# Patient Record
Sex: MALE | URBAN - METROPOLITAN AREA
[De-identification: names, ages, dates, MRNs, and addresses within clinical notes are randomized per-mention and may not be internally consistent; named-entity substitution may affect disease eponyms.]

---

## 2018-03-15 ENCOUNTER — OFFICE VISIT (OUTPATIENT)
Dept: PSYCHIATRY | Facility: CLINIC | Age: 33
End: 2018-03-15
Attending: PSYCHOLOGIST
Payer: COMMERCIAL

## 2018-03-15 DIAGNOSIS — F43.23 ADJUSTMENT DISORDER WITH MIXED ANXIETY AND DEPRESSED MOOD: Primary | ICD-10-CM

## 2018-03-15 ASSESSMENT — ANXIETY QUESTIONNAIRES
3. WORRYING TOO MUCH ABOUT DIFFERENT THINGS: NOT AT ALL
1. FEELING NERVOUS, ANXIOUS, OR ON EDGE: NOT AT ALL
2. NOT BEING ABLE TO STOP OR CONTROL WORRYING: NOT AT ALL
5. BEING SO RESTLESS THAT IT IS HARD TO SIT STILL: NOT AT ALL
GAD7 TOTAL SCORE: 0
7. FEELING AFRAID AS IF SOMETHING AWFUL MIGHT HAPPEN: NOT AT ALL
6. BECOMING EASILY ANNOYED OR IRRITABLE: NOT AT ALL
IF YOU CHECKED OFF ANY PROBLEMS ON THIS QUESTIONNAIRE, HOW DIFFICULT HAVE THESE PROBLEMS MADE IT FOR YOU TO DO YOUR WORK, TAKE CARE OF THINGS AT HOME, OR GET ALONG WITH OTHER PEOPLE: NOT DIFFICULT AT ALL

## 2018-03-15 ASSESSMENT — PATIENT HEALTH QUESTIONNAIRE - PHQ9: 5. POOR APPETITE OR OVEREATING: NOT AT ALL

## 2018-03-15 NOTE — MR AVS SNAPSHOT
After Visit Summary   3/15/2018    Sharad Hernandez    MRN: [id number]           Patient Information     Date Of Birth          1985        Visit Information        Provider Department      3/15/2018 4:00 PM Janett Prater, PhD [facility]        Today's Diagnoses     Adjustment disorder with mixed anxiety and depressed mood    -  1       Follow-ups after your visit        Your next 10 appointments already scheduled     Mar 28, 2018  4:00 PM CDT   Adult Psychotherapy with Janett Carey, PhD   Psychiatry Clinic ([facility])    [place] [id number]  [address] [id number] [phone]              Who to contact     Please call your clinic at [phone] to:    Ask questions about your health    Make or cancel appointments    Discuss your medicines    Learn about your test results    Speak to your doctor            Additional Information About Your Visit        MyChart Information     [employer] is an electronic gateway that provides easy, online access to your medical records. With [employer], you can request a clinic appointment, read your test results, renew a prescription or communicate with your care team.     To sign up for [employer] visit the website at [email or web address]   You will be asked to enter the access code listed below, as well as some personal information. Please follow the directions to create your username and password.     Your access code is: [email or web address]  Expires: 2018  5:12 PM     Your access code will  in 90 days. If you need help or a new code, please contact your [facility] or call [phone] for assistance.        Care EveryWhere ID     This is your Care EveryWhere ID. This could be used by other organizations to access your [place] medical records  [id number]         Blood Pressure from Last 3 Encounters:   No data found for BP    Weight from Last  3 Encounters:   No data found for Wt              Today, you had the following     No orders found for display       Primary Care Provider Office Phone # Fax #    Ej Solares [phone]       [address] [place] [address]        Equal Access to Services     ANNABELLA ZEPEDA : Hadii aad ku hadushahazel Sonoelali, waaxda luqadaha, qaybta kaalmada adelawsonyada, heri de aditiaislinn snelltawannasherry leon. So [facility] [phone].    ATENCIÓN: Si habla español, tiene a gore disposición servicios gratuitos de asistencia lingüística. Llame al [phone].    We comply with applicable federal civil rights laws and [place] laws. We do not discriminate on the basis of race, color, national origin, age, disability, sex, sexual orientation, or gender identity.            Thank you!     Thank you for choosing PSYCHIATRY CLINIC  for your care. Our goal is always to provide you with excellent care. Hearing back from our patients is one way we can continue to improve our services. Please take a few minutes to complete the written survey that you may receive in the mail after your visit with us. Thank you!             Your Updated Medication List - Protect others around you: Learn how to safely use, store and throw away your medicines at www.disposemymeds.org.      Notice  As of 3/15/2018  5:12 PM    You have not been prescribed any medications.
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
## 2018-03-29 ENCOUNTER — OFFICE VISIT (OUTPATIENT)
Dept: PSYCHIATRY | Facility: CLINIC | Age: 33
End: 2018-03-29
Attending: PSYCHOLOGIST
Payer: COMMERCIAL

## 2018-03-29 DIAGNOSIS — F43.23 ADJUSTMENT DISORDER WITH MIXED ANXIETY AND DEPRESSED MOOD: Primary | ICD-10-CM

## 2018-03-29 NOTE — PROGRESS NOTES
Therapy Notes  Provider: Janett Carey, PhD, L.P.  Start time: 1:02 pm Stop Time: 2:04 pm     Objective: D arrived on time.    Subjective: D and I discussed his lack of knowledge about his estranged wife and the uncertainly that this has created in his life. CURTIS is not ready to give up on the marriage. We discussed the various possibilities of his wife's well being and worked on CURTIS's catastrophic expectations with regards to his wife well being. We agreed that CURTIS would send an email to his wife during 3 consecutive weeks asking to clarify the state of the relationship. I assessed suicide ideation and underscored the availability of multiple options for his care.    Plan: Review homework      Diagnosis.  Adjustment disorder with mixed anxiety and depressed mood

## 2018-03-29 NOTE — MR AVS SNAPSHOT
After Visit Summary   3/29/2018    Sharad Hernandez    MRN: [id number]           Patient Information     Date Of Birth          1985        Visit Information        Provider Department      3/29/2018 1:00 PM Janett Prater, PhD Psychiatry Clinic        Today's Diagnoses     Adjustment disorder with mixed anxiety and depressed mood    -  1       Follow-ups after your visit        Your next 10 appointments already scheduled     2018  1:00 PM CDT   Adult Psychotherapy with Janett Carey, PhD   Psychiatry Clinic ([facility])    [place] [id number]  [address] [id number] [phone]              Who to contact     Please call your clinic at [phone] to:    Ask questions about your health    Make or cancel appointments    Discuss your medicines    Learn about your test results    Speak to your doctor            Additional Information About Your Visit        MyChart Information     [employer] is an electronic gateway that provides easy, online access to your medical records. With [employer], you can request a clinic appointment, read your test results, renew a prescription or communicate with your care team.     To sign up for [employer] visit the website at [email or web address]   You will be asked to enter the access code listed below, as well as some personal information. Please follow the directions to create your username and password.     Your access code is: [email or web address]  Expires: 2018  5:12 PM     Your access code will  in 90 days. If you need help or a new code, please contact your [facility] or call [phone] for assistance.        Care EveryWhere ID     This is your Care EveryWhere ID. This could be used by other organizations to access your [place] medical records  [id number]         Blood Pressure from Last 3 Encounters:   No data found for BP    Weight from Last  3 Encounters:   No data found for Wt              Today, you had the following     No orders found for display       Primary Care Provider Office Phone # Fax #    Ej Solares [phone]       [address] [place] [address]        Equal Access to Services     ANNABELLA ZEPEDA : Hadii aad ku hadushahazel Sonoelali, waaxda luqadaha, qaybta kaalmada adelawsonyada, heri de aditiaislinn snelltawannasherry leon. So [facility] [phone].    ATENCIÓN: Si habla español, tiene a gore disposición servicios gratuitos de asistencia lingüística. Llame al [phone].    We comply with applicable federal civil rights laws and [place] laws. We do not discriminate on the basis of race, color, national origin, age, disability, sex, sexual orientation, or gender identity.            Thank you!     Thank you for choosing PSYCHIATRY CLINIC  for your care. Our goal is always to provide you with excellent care. Hearing back from our patients is one way we can continue to improve our services. Please take a few minutes to complete the written survey that you may receive in the mail after your visit with us. Thank you!             Your Updated Medication List - Protect others around you: Learn how to safely use, store and throw away your medicines at www.disposemymeds.org.      Notice  As of 3/29/2018  2:02 PM    You have not been prescribed any medications.

## 2018-04-12 ENCOUNTER — OFFICE VISIT (OUTPATIENT)
Dept: PSYCHIATRY | Facility: CLINIC | Age: 33
End: 2018-04-12
Attending: PSYCHOLOGIST
Payer: COMMERCIAL

## 2018-04-12 DIAGNOSIS — F43.23 ADJUSTMENT DISORDER WITH MIXED ANXIETY AND DEPRESSED MOOD: Primary | ICD-10-CM

## 2018-04-12 NOTE — MR AVS SNAPSHOT
After Visit Summary   2018    Sharad Hernandez    MRN: [id number]           Patient Information     Date Of Birth          1985        Visit Information        Provider Department      2018 1:00 PM Janett Prater, PhD [facility]        Today's Diagnoses     Adjustment disorder with mixed anxiety and depressed mood    -  1       Follow-ups after your visit        Your next 10 appointments already scheduled     2018  1:00 PM CDT   Adult Psychotherapy with Janett Carey, PhD   Psychiatry Clinic ([facility])    [place] [id number]  [address] [id number] [phone]              Who to contact     Please call your clinic at [phone] to:    Ask questions about your health    Make or cancel appointments    Discuss your medicines    Learn about your test results    Speak to your doctor            Additional Information About Your Visit        MyChart Information     Proteros biostructurest is an electronic gateway that provides easy, online access to your medical records. With Promoter.io, you can request a clinic appointment, read your test results, renew a prescription or communicate with your care team.     To sign up for Proteros biostructurest visit the website at [email or web address]   You will be asked to enter the access code listed below, as well as some personal information. Please follow the directions to create your username and password.     Your access code is: [email or web address]  Expires: 2018  5:12 PM     Your access code will  in 90 days. If you need help or a new code, please contact your [facility] or call [phone] for assistance.        Care EveryWhere ID     This is your Care EveryWhere ID. This could be used by other organizations to access your [place] medical records  [id number]         Blood Pressure from Last 3 Encounters:   No data found for BP    Weight from Last  3 Encounters:   No data found for Wt              Today, you had the following     No orders found for display       Primary Care Provider Office Phone # Fax #    Ej Solares [phone]       [address] [place] [address]        Equal Access to Services     ANNABELLA ZEPEDA : Hadii aad ku hadushahazel Sonoelali, waaxda luqadaha, qaybta kaalmada adelawsonyada, heri de aditiaislinn snelltawannasherry leon. So [facility] [phone].    ATENCIÓN: Si habla español, tiene a gore disposición servicios gratuitos de asistencia lingüística. Llame al [phone].    We comply with applicable federal civil rights laws and [place] laws. We do not discriminate on the basis of race, color, national origin, age, disability, sex, sexual orientation, or gender identity.            Thank you!     Thank you for choosing [facility]  for your care. Our goal is always to provide you with excellent care. Hearing back from our patients is one way we can continue to improve our services. Please take a few minutes to complete the written survey that you may receive in the mail after your visit with us. Thank you!             Your Updated Medication List - Protect others around you: Learn how to safely use, store and throw away your medicines at www.disposemymeds.org.      Notice  As of 4/12/2018  2:01 PM    You have not been prescribed any medications.

## 2018-04-12 NOTE — PROGRESS NOTES
Therapy Notes  Provider: Janett Carey, PhD, L.P.  Start time: 1:02 pm Stop Time: 2:00 pm      Objective: D arrived on time.     Subjective: D and I reviewed his treatment plan in detailed. We also reviewed his sense of guilt and responsibility for having been absent during his father's illness due to feeling duty bound to his wife who was forbidding him or dissuading him consistently of maintaining or keeping relations with family and friends. D described several instances of his wife demanding that he stood up for her due to perceived social attacks from D and his friends. This resulted in CURTIS keeping his social ties hidden from his wife and having to contact family and friends during his work hours or while commuting in his car.    Plan: Review sense of responsibility and guilt linked to wife's mental illness.     Diagnosis.  Adjustment disorder with mixed anxiety and depressed mood

## 2018-04-20 ENCOUNTER — OFFICE VISIT (OUTPATIENT)
Dept: PSYCHIATRY | Facility: CLINIC | Age: 33
End: 2018-04-20
Attending: PSYCHOLOGIST
Payer: COMMERCIAL

## 2018-04-20 DIAGNOSIS — F43.23 ADJUSTMENT DISORDER WITH MIXED ANXIETY AND DEPRESSED MOOD: Primary | ICD-10-CM

## 2018-04-20 NOTE — MR AVS SNAPSHOT
After Visit Summary   2018    Sharad Hernandez    MRN: [id number]           Patient Information     Date Of Birth          1985        Visit Information        Provider Department      2018 1:00 PM Janett Prater, PhD [facility]        Today's Diagnoses     Adjustment disorder with mixed anxiety and depressed mood    -  1       Follow-ups after your visit        Your next 10 appointments already scheduled     2018  1:00 PM CDT   Adult Psychotherapy with Janett Carey, PhD   Psychiatry Clinic ([facility])    [place] [id number]  [address] [id number] [phone]              Who to contact     Please call your clinic at [phone] to:    Ask questions about your health    Make or cancel appointments    Discuss your medicines    Learn about your test results    Speak to your doctor            Additional Information About Your Visit        MyChart Information     LeadPointt is an electronic gateway that provides easy, online access to your medical records. With VanDyne SuperTurbo, you can request a clinic appointment, read your test results, renew a prescription or communicate with your care team.     To sign up for LeadPointt visit the website at [email or web address]   You will be asked to enter the access code listed below, as well as some personal information. Please follow the directions to create your username and password.     Your access code is: [email or web address]  Expires: 2018  5:12 PM     Your access code will  in 90 days. If you need help or a new code, please contact your [facility] or call [phone] for assistance.        Care EveryWhere ID     This is your Care EveryWhere ID. This could be used by other organizations to access your [place] medical records  [id number]         Blood Pressure from Last 3 Encounters:   No data found for BP    Weight from Last  3 Encounters:   No data found for Wt              Today, you had the following     No orders found for display       Primary Care Provider Office Phone # Fax #    Ej Solares [phone]       [address] [place] [address]        Equal Access to Services     ANNABELLA ZEPEDA : Hadii aad ku hadushahazel Sonoelali, waaxda luqadaha, qaybta kaalmada adelawsonyada, heri de aditiaislinn snelltawannasherry leon. So [facility] [phone].    ATENCIÓN: Si habla español, tiene a gore disposición servicios gratuitos de asistencia lingüística. Llame al [phone].    We comply with applicable federal civil rights laws and [place] laws. We do not discriminate on the basis of race, color, national origin, age, disability, sex, sexual orientation, or gender identity.            Thank you!     Thank you for choosing [facility]RY CLINIC  for your care. Our goal is always to provide you with excellent care. Hearing back from our patients is one way we can continue to improve our services. Please take a few minutes to complete the written survey that you may receive in the mail after your visit with us. Thank you!             Your Updated Medication List - Protect others around you: Learn how to safely use, store and throw away your medicines at www.disposemymeds.org.      Notice  As of 4/20/2018  2:15 PM    You have not been prescribed any medications.

## 2018-04-20 NOTE — PROGRESS NOTES
Therapy Notes  Provider: Janett Carey, PhD, L.P.  Start time: 1:10 pm Stop Time: 2:10 pm       Objective: D arrived on time.      Subjective: D and and I reviewed his goals for therapy and his recent interactions with his wife's family. We also discussed his fears of not been accepted by a future partner given that he holds strong Zoroastrianism beliefs about ending a marriage and that he would want to see partners in the future that hold similar believes. We discussed D's desire to become more insightful about how and why is he attracted to a given partner and D agreed with me to begin using an attachment therapy franework to examine both his framework for relationships and the needs that he hopes to fulfill in future relationships.    Plan: Review attachment status with regards to relationships particularly parents and past significant others.    Diagnosis.  Adjustment disorder with mixed anxiety and depressed mood

## 2018-04-27 ENCOUNTER — OFFICE VISIT (OUTPATIENT)
Dept: PSYCHIATRY | Facility: CLINIC | Age: 33
End: 2018-04-27
Attending: PSYCHOLOGIST
Payer: COMMERCIAL

## 2018-04-27 DIAGNOSIS — F43.23 ADJUSTMENT DISORDER WITH MIXED ANXIETY AND DEPRESSED MOOD: Primary | ICD-10-CM

## 2018-04-27 NOTE — PROGRESS NOTES
Therapy Notes  Provider: Jaentt Carey, PhD, L.P.  Start time: 1:10 pm Stop Time: 2:10 pm       Objective: D arrived on time.      Subjective: D stated that he was served with a divorce, his grandmother is dying and is construction season. We spent the entire session actively engage in emotion regulation and copying strategy shaping.     Plan: Check on how the copying strategy shaping we devised is working.  Diagnosis.  Adjustment disorder with mixed anxiety and depressed mood

## 2018-04-27 NOTE — MR AVS SNAPSHOT
After Visit Summary   2018    hSarad Hernandez    MRN: [id number]           Patient Information     Date Of Birth          1985        Visit Information        Provider Department      2018 1:00 PM Janett Prater, PhD [facility]        Today's Diagnoses     Adjustment disorder with mixed anxiety and depressed mood    -  1       Follow-ups after your visit        Who to contact     Please call your clinic at [phone] to:    Ask questions about your health    Make or cancel appointments    Discuss your medicines    Learn about your test results    Speak to your doctor            Additional Information About Your Visit        MyChart Information     TextMaster is an electronic gateway that provides easy, online access to your medical records. With TextMaster, you can request a clinic appointment, read your test results, renew a prescription or communicate with your care team.     To sign up for IndiaHomest visit the website at [email or web address]   You will be asked to enter the access code listed below, as well as some personal information. Please follow the directions to create your username and password.     Your access code is: [email or web address]  Expires: 2018  5:12 PM     Your access code will  in 90 days. If you need help or a new code, please contact your [facility] or call [phone] for assistance.        Care EveryWhere ID     This is your Care EveryWhere ID. This could be used by other organizations to access your [place] medical records  [id number]         Blood Pressure from Last 3 Encounters:   No data found for BP    Weight from Last 3 Encounters:   No data found for Wt              Today, you had the following     No orders found for display       Primary Care Provider Office Phone # Fax #    Ej Solares [phone] [address] [facility] [address]        Equal Access to Services      ANNABELLA ZEPEDA : Hadii aad shelby farhad Zhao, waedida luqadaha, qaybta kaalmada lorikailyncierra, heri snelltawannasherry leon. So [facility] [phone].    ATENCIÓN: Si habla español, tiene a gore disposición servicios gratuitos de asistencia lingüística. Llame al [phone].    We comply with applicable federal civil rights laws and [place] laws. We do not discriminate on the basis of race, color, national origin, age, disability, sex, sexual orientation, or gender identity.            Thank you!     Thank you for choosing [facility]RY CLINIC  for your care. Our goal is always to provide you with excellent care. Hearing back from our patients is one way we can continue to improve our services. Please take a few minutes to complete the written survey that you may receive in the mail after your visit with us. Thank you!             Your Updated Medication List - Protect others around you: Learn how to safely use, store and throw away your medicines at www.disposemymeds.org.      Notice  As of 4/27/2018  2:07 PM    You have not been prescribed any medications.

## 2018-05-04 ENCOUNTER — OFFICE VISIT (OUTPATIENT)
Dept: PSYCHIATRY | Facility: CLINIC | Age: 33
End: 2018-05-04
Attending: PSYCHIATRY & NEUROLOGY
Payer: COMMERCIAL

## 2018-05-04 DIAGNOSIS — F43.23 ADJUSTMENT DISORDER WITH MIXED ANXIETY AND DEPRESSED MOOD: Primary | ICD-10-CM

## 2018-05-04 NOTE — MR AVS SNAPSHOT
After Visit Summary   2018    Sharad Hernandez    MRN: [id number]           Patient Information     Date Of Birth          1985        Visit Information        Provider Department      2018 2:00 PM Janett Prater, PhD [facility]ry [facility]        Today's Diagnoses     Adjustment disorder with mixed anxiety and depressed mood    -  1       Follow-ups after your visit        Your next 10 appointments already scheduled     May 11, 2018  5:00 PM CDT   Adult Psychotherapy with Janett Carey, PhD   Psychiatry Clinic ([facility])    [address]  [address] [phone]              Who to contact     Please call your clinic at [phone] to:    Ask questions about your health    Make or cancel appointments    Discuss your medicines    Learn about your test results    Speak to your doctor            Additional Information About Your Visit        MyChart Information     Digital Message Displayt is an electronic gateway that provides easy, online access to your medical records. With MSM Protein Technologies, you can request a clinic appointment, read your test results, renew a prescription or communicate with your care team.     To sign up for Digital Message Displayt visit the website at [email or web address]   You will be asked to enter the access code listed below, as well as some personal information. Please follow the directions to create your username and password.     Your access code is: [email or web address]  Expires: 2018  5:12 PM     Your access code will  in 90 days. If you need help or a new code, please contact your [facility] or call [phone] for assistance.        Care EveryWhere ID     This is your Care EveryWhere ID. This could be used by other organizations to access your [place] medical records  [id number]         Blood Pressure from Last 3 Encounters:   No data found for BP    Weight from Last 3  Encounters:   No data found for Wt              Today, you had the following     No orders found for display       Primary Care Provider Office Phone # Fax #    Ej Solares [phone]       [address] [place] [address]        Equal Access to Services     ANNABELLA ZEPEDA : Hadii aad ku hadushahazel Sonoelali, waaxda luqadaha, qaybta kaalmada adelawsonkailynda, waxay idiin hayomaraislinn snelltawannasherry leon. So [facility] [phone].    ATENCIÓN: Si habla español, tiene a gore disposición servicios gratuitos de asistencia lingüística. Llame al [phone].    We comply with applicable federal civil rights laws and [place] laws. We do not discriminate on the basis of race, color, national origin, age, disability, sex, sexual orientation, or gender identity.            Thank you!     Thank you for choosing [facility]ATRY CLINIC  for your care. Our goal is always to provide you with excellent care. Hearing back from our patients is one way we can continue to improve our services. Please take a few minutes to complete the written survey that you may receive in the mail after your visit with us. Thank you!             Your Updated Medication List - Protect others around you: Learn how to safely use, store and throw away your medicines at www.disposemymeds.org.      Notice  As of 5/4/2018  3:08 PM    You have not been prescribed any medications.

## 2018-05-04 NOTE — PROGRESS NOTES
Therapy Notes  Provider: Janett Carey, PhD, L.P.  Start time: 2:09 pm Stop Time: 3:08  pm       Objective: D arrived on time.      Subjective: D stated that his wife served him with divorce papers. We discussed his emotional coping strategies with these upsetting news and spent the rest of the session addressing attachment patterns in romantic relationships and how they could have influenced both his current situation, his past romantic history and his future choices.      Assessment: CURTIS is coping well with the stressors of the divorce.    Plan: Continue reviewing coping with the divorce and continue exploring romantic attachment history and needs enacted in CURTIS's past that might inform his future choices.    Diagnosis.  Adjustment disorder with mixed anxiety and depressed mood

## 2018-05-11 ENCOUNTER — OFFICE VISIT (OUTPATIENT)
Dept: PSYCHIATRY | Facility: CLINIC | Age: 33
End: 2018-05-11
Attending: PSYCHOLOGIST
Payer: COMMERCIAL

## 2018-05-11 DIAGNOSIS — F43.23 ADJUSTMENT DISORDER WITH MIXED ANXIETY AND DEPRESSED MOOD: Primary | ICD-10-CM

## 2018-05-11 NOTE — PROGRESS NOTES
Therapy Notes  Provider: Janett Carey, PhD, L.P.  Start time: 5:00 pm Stop Time: 6:05 pm       Objective: D arrived on time.      Subjective: D stated that his grandmother is doing relatively well. He has not received any news from his ex wife since the divorce was filed. We talked about the things that he would miss about his marriage including having someone to come home and somebody to cook for. He also stated that he would miss his sex life and his connection with his wife.      Assessment: CURTIS is coping well with the stressors of the divorce.     Plan: Continue reviewing coping with the divorce and continue exploring romantic attachment history and needs enacted in CURTIS's past that might inform his future choices.     Diagnosis.  Adjustment disorder with mixed anxiety and depressed mood

## 2018-05-11 NOTE — MR AVS SNAPSHOT
After Visit Summary   2018    Sharad Hernandez    MRN: [id number]           Patient Information     Date Of Birth          1985        Visit Information        Provider Department      2018 5:00 PM Janett Prater, PhD Psych[facility]ry Clinic        Today's Diagnoses     Adjustment disorder with mixed anxiety and depressed mood    -  1       Follow-ups after your visit        Who to contact     Please call your clinic at [phone] to:    Ask questions about your health    Make or cancel appointments    Discuss your medicines    Learn about your test results    Speak to your doctor            Additional Information About Your Visit        MyChart Information     Remind Technologies is an electronic gateway that provides easy, online access to your medical records. With Remind Technologies, you can request a clinic appointment, read your test results, renew a prescription or communicate with your care team.     To sign up for Metaweb Technologiest visit the website at [email or web address]   You will be asked to enter the access code listed below, as well as some personal information. Please follow the directions to create your username and password.     Your access code is: [email or web address]  Expires: 2018  5:12 PM     Your access code will  in 90 days. If you need help or a new code, please contact your [facility] or call [phone] for assistance.        Care EveryWhere ID     This is your Care EveryWhere ID. This could be used by other organizations to access your [place] medical records  [id number]         Blood Pressure from Last 3 Encounters:   No data found for BP    Weight from Last 3 Encounters:   No data found for Wt              Today, you had the following     No orders found for display       Primary Care Provider Office Phone # Fax #    Ej Solares [phone] [address] [facility] [address]        Equal Access to Services      ANNABELLA ZEPEDA : Hadii aad shelby farhad Zhao, waedida luqadaha, qaybta kaalmada lorikailyncierra, heri snelltawannasherry leon. So [facility] [phone].    ATENCIÓN: Si habla español, tiene a gore disposición servicios gratuitos de asistencia lingüística. Llame al [phone].    We comply with applicable federal civil rights laws and [place] laws. We do not discriminate on the basis of race, color, national origin, age, disability, sex, sexual orientation, or gender identity.            Thank you!     Thank you for choosing [facility]RY [facility]IN[facility]  for your care. Our goal is always to provide you with excellent care. Hearing back from our patients is one way we can continue to improve our services. Please take a few minutes to complete the written survey that you may receive in the mail after your visit with us. Thank you!             Your Updated Medication List - Protect others around you: Learn how to safely use, store and throw away your medicines at www.disposemymeds.org.      Notice  As of 5/11/2018  6:03 PM    You have not been prescribed any medications.

## 2018-05-16 ASSESSMENT — ANXIETY QUESTIONNAIRES: GAD7 TOTAL SCORE: 0

## 2018-05-16 ASSESSMENT — PATIENT HEALTH QUESTIONNAIRE - PHQ9: SUM OF ALL RESPONSES TO PHQ QUESTIONS 1-9: 2

## 2018-05-24 ENCOUNTER — OFFICE VISIT (OUTPATIENT)
Dept: PSYCHIATRY | Facility: CLINIC | Age: 33
End: 2018-05-24
Attending: PSYCHOLOGIST
Payer: COMMERCIAL

## 2018-05-24 DIAGNOSIS — F43.23 ADJUSTMENT DISORDER WITH MIXED ANXIETY AND DEPRESSED MOOD: Primary | ICD-10-CM

## 2018-05-24 NOTE — PROGRESS NOTES
Therapy Notes  Provider: Janett Carey, PhD, L.P.  Start time: 5:00 pm Stop Time:5:56 pm       Objective: D arrived on time.      Subjective: D stated that his grandmother appears to be doing a bit better and they have moved her to assisted living. We talked about his fears of been evaluated negatively at his work because he is at 80% as well as how the different stressors that he is experiencing changes the perception of the stressors at work by augmenting them.      Assessment: CURTIS is coping well with the stressors of the divorce.      Plan: Continue reviewing coping with the divorce and continue exploring romantic attachment history and needs enacted in CURTIS's past that might inform his future choices.      Diagnosis.  Adjustment disorder with mixed anxiety and depressed mood

## 2018-05-24 NOTE — MR AVS SNAPSHOT
After Visit Summary   2018    Sharad Hernandez    MRN: [id number]           Patient Information     Date Of Birth          1985        Visit Information        Provider Department      2018 5:00 PM Janett Prater, PhD [facility]        Today's Diagnoses     Adjustment disorder with mixed anxiety and depressed mood    -  1       Follow-ups after your visit        Who to contact     Please call your clinic at [phone] to:    Ask questions about your health    Make or cancel appointments    Discuss your medicines    Learn about your test results    Speak to your doctor            Additional Information About Your Visit        MyChart Information     TheCommentor is an electronic gateway that provides easy, online access to your medical records. With TheCommentor, you can request a clinic appointment, read your test results, renew a prescription or communicate with your care team.     To sign up for Barburritot visit the website at [email or web address]   You will be asked to enter the access code listed below, as well as some personal information. Please follow the directions to create your username and password.     Your access code is: [email or web address]  Expires: 2018  4:51 PM     Your access code will  in 90 days. If you need help or a new code, please contact your [facility] or call [phone] for assistance.        Care EveryWhere ID     This is your Care EveryWhere ID. This could be used by other organizations to access your [place] medical records  [id number]         Blood Pressure from Last 3 Encounters:   No data found for BP    Weight from Last 3 Encounters:   No data found for Wt              Today, you had the following     No orders found for display       Primary Care Provider Office Phone # Fax #    Ej Solares [phone] [address] [facility] [address]        Equal Access to Services      ANNABELLA ZEPEDA : Hadii aad shelby farhad Zhao, waedida luqadaha, qaybta kaalmada lorikailyncierra, heri snelltawannasherry leon. So [facility] [phone].    ATENCIÓN: Si habla español, tiene a gore disposición servicios gratuitos de asistencia lingüística. Llame al [phone].    We comply with applicable federal civil rights laws and [place] laws. We do not discriminate on the basis of race, color, national origin, age, disability, sex, sexual orientation, or gender identity.            Thank you!     Thank you for choosing [facility]  for your care. Our goal is always to provide you with excellent care. Hearing back from our patients is one way we can continue to improve our services. Please take a few minutes to complete the written survey that you may receive in the mail after your visit with us. Thank you!             Your Updated Medication List - Protect others around you: Learn how to safely use, store and throw away your medicines at www.disposemymeds.org.      Notice  As of 5/24/2018  5:57 PM    You have not been prescribed any medications.

## 2018-05-31 ENCOUNTER — OFFICE VISIT (OUTPATIENT)
Dept: PSYCHIATRY | Facility: CLINIC | Age: 33
End: 2018-05-31
Attending: PSYCHOLOGIST
Payer: COMMERCIAL

## 2018-05-31 DIAGNOSIS — F43.23 ADJUSTMENT DISORDER WITH MIXED ANXIETY AND DEPRESSED MOOD: Primary | ICD-10-CM
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
## 2018-05-31 NOTE — MR AVS SNAPSHOT
After Visit Summary   2018    Sharad Hernandez    MRN: [id number]           Patient Information     Date Of Birth          1985        Visit Information        Provider Department      2018 4:00 PM Janett Prater, PhD [facility]        Today's Diagnoses     Adjustment disorder with mixed anxiety and depressed mood    -  1       Follow-ups after your visit        Who to contact     Please call your clinic at [phone] to:    Ask questions about your health    Make or cancel appointments    Discuss your medicines    Learn about your test results    Speak to your doctor            Additional Information About Your Visit        MyChart Information     Konoz is an electronic gateway that provides easy, online access to your medical records. With Konoz, you can request a clinic appointment, read your test results, renew a prescription or communicate with your care team.     To sign up for wiMANt visit the website at [email or web address]   You will be asked to enter the access code listed below, as well as some personal information. Please follow the directions to create your username and password.     Your access code is: [email or web address]  Expires: 2018  4:51 PM     Your access code will  in 90 days. If you need help or a new code, please contact your [facility] or call [phone] for assistance.        Care EveryWhere ID     This is your Care EveryWhere ID. This could be used by other organizations to access your [place] medical records  [id number]         Blood Pressure from Last 3 Encounters:   No data found for BP    Weight from Last 3 Encounters:   No data found for Wt              Today, you had the following     No orders found for display       Primary Care Provider Office Phone # Fax #    Ej Solares [phone] [address] [facility] [address]        Equal Access to Services      ANNABELLA ZEPEDA : Hadii aad shelby farhad Zhao, waedida luqadaha, qaybta kaalmada lorikailyncierra, heri snelltawannasherry leon. So [facility] [phone].    ATENCIÓN: Si habla español, tiene a gore disposición servicios gratuitos de asistencia lingüística. Llame al [phone].    We comply with applicable federal civil rights laws and [place] laws. We do not discriminate on the basis of race, color, national origin, age, disability, sex, sexual orientation, or gender identity.            Thank you!     Thank you for choosing [facility]IN[facility]  for your care. Our goal is always to provide you with excellent care. Hearing back from our patients is one way we can continue to improve our services. Please take a few minutes to complete the written survey that you may receive in the mail after your visit with us. Thank you!             Your Updated Medication List - Protect others around you: Learn how to safely use, store and throw away your medicines at www.disposemymeds.org.      Notice  As of 5/31/2018  5:01 PM    You have not been prescribed any medications.

## 2018-06-07 ENCOUNTER — OFFICE VISIT (OUTPATIENT)
Dept: PSYCHIATRY | Facility: CLINIC | Age: 33
End: 2018-06-07
Attending: PSYCHOLOGIST
Payer: COMMERCIAL

## 2018-06-07 DIAGNOSIS — F43.23 ADJUSTMENT DISORDER WITH MIXED ANXIETY AND DEPRESSED MOOD: Primary | ICD-10-CM

## 2018-06-07 NOTE — MR AVS SNAPSHOT
After Visit Summary   2018    Sharad Hernandez    MRN: [id number]           Patient Information     Date Of Birth          1985        Visit Information        Provider Department      2018 5:00 PM Janett Prater, PhD [facility]        Today's Diagnoses     Adjustment disorder with mixed anxiety and depressed mood    -  1       Follow-ups after your visit        Who to contact     Please call your clinic at [phone] to:    Ask questions about your health    Make or cancel appointments    Discuss your medicines    Learn about your test results    Speak to your doctor            Additional Information About Your Visit        MyChart Information     poLight is an electronic gateway that provides easy, online access to your medical records. With poLight, you can request a clinic appointment, read your test results, renew a prescription or communicate with your care team.     To sign up for Coherent Labst visit the website at [email or web address]   You will be asked to enter the access code listed below, as well as some personal information. Please follow the directions to create your username and password.     Your access code is: [email or web address]  Expires: 2018  4:51 PM     Your access code will  in 90 days. If you need help or a new code, please contact your [facility] or call [phone] for assistance.        Care EveryWhere ID     This is your Care EveryWhere ID. This could be used by other organizations to access your [place] medical records  [id number]         Blood Pressure from Last 3 Encounters:   No data found for BP    Weight from Last 3 Encounters:   No data found for Wt              Today, you had the following     No orders found for display       Primary Care Provider Office Phone # Fax #    Ej Solares [phone] [address] [facility] [address]        Equal Access to Services     ANNABELLA  GAAR : Hadii aad ku hadushahazel Pavel, waedida luqadaha, qaybta kajajacierra sandovalkailyncierra, heri snelltawannasherry leon. So [facility] [phone].    ATENCIÓN: Si habla español, tiene a gore disposición servicios gratuitos de asistencia lingüística. Llame al [phone].    We comply with applicable federal civil rights laws and [place] laws. We do not discriminate on the basis of race, color, national origin, age, disability, sex, sexual orientation, or gender identity.            Thank you!     Thank you for choosing [facility]  for your care. Our goal is always to provide you with excellent care. Hearing back from our patients is one way we can continue to improve our services. Please take a few minutes to complete the written survey that you may receive in the mail after your visit with us. Thank you!             Your Updated Medication List - Protect others around you: Learn how to safely use, store and throw away your medicines at www.disposemymeds.org.      Notice  As of 6/7/2018  5:47 PM    You have not been prescribed any medications.

## 2018-06-07 NOTE — PROGRESS NOTES
Therapy Notes  Provider: Janett Carey, PhD, L.P.  Start time: 5:00 pm Stop Time: 5:50 pm       Objective: CURTIS arrived on time.      Subjective: CURTIS stated that her GM is an assisted living situation. He stated that his grandmother is in a good place though he feels like his parents are under a lot of pressure so he feels that he need to spend time with them. His father has multiple myeloma, he has also a demanding job as a outpatient nurse. We talked about his father's anxiety regarding loosing his job and the connection between that anxiety and his own anxiety, We decided that the best thing would be for CURTIS to have an empathic conversation with this father and bond with him around anxieties that are mutual and to openly discuss possibilities for diminishing the intensity of the work, while been respectful of his father dignity and self-determination.      Assessment: CURTIS is coping well with the stressors of the divorce, by working out, spending time with his nephews and hanging out with his family.    Plan: Continue exposure as needed.      Diagnosis.  Adjustment disorder with mixed anxiety and depressed mood

## 2018-06-14 ENCOUNTER — OFFICE VISIT (OUTPATIENT)
Dept: PSYCHIATRY | Facility: CLINIC | Age: 33
End: 2018-06-14
Attending: PSYCHOLOGIST
Payer: COMMERCIAL

## 2018-06-14 DIAGNOSIS — F43.23 ADJUSTMENT DISORDER WITH MIXED ANXIETY AND DEPRESSED MOOD: Primary | ICD-10-CM

## 2018-06-14 NOTE — MR AVS SNAPSHOT
After Visit Summary   2018    Sharad Hernandez    MRN: [id number]           Patient Information     Date Of Birth          1985        Visit Information        Provider Department      2018 5:00 PM Janett Prater, PhD [facility]        Today's Diagnoses     Adjustment disorder with mixed anxiety and depressed mood    -  1       Follow-ups after your visit        Who to contact     Please call your clinic at [phone] to:    Ask questions about your health    Make or cancel appointments    Discuss your medicines    Learn about your test results    Speak to your doctor            Additional Information About Your Visit        MyChart Information     Synergos is an electronic gateway that provides easy, online access to your medical records. With Synergos, you can request a clinic appointment, read your test results, renew a prescription or communicate with your care team.     To sign up for Forge Life Sciencet visit the website at [email or web address]   You will be asked to enter the access code listed below, as well as some personal information. Please follow the directions to create your username and password.     Your access code is: [email or web address]  Expires: 2018  4:51 PM     Your access code will  in 90 days. If you need help or a new code, please contact your [facility] or call [phone] for assistance.        Care EveryWhere ID     This is your Care EveryWhere ID. This could be used by other organizations to access your [place] medical records  [id number]         Blood Pressure from Last 3 Encounters:   No data found for BP    Weight from Last 3 Encounters:   No data found for Wt              Today, you had the following     No orders found for display       Primary Care Provider Office Phone # Fax #    Ej Solares [phone] [address] [facility] [address]        Equal Access to Services      ANNABELLA ZEPEDA : Hadii aad shelby farhad Zhao, waedida luqadaha, qaybta kaalmada lorikailyncierra, heri snelltawannasherry leon. So [facility] [phone].    ATENCIÓN: Si habla español, tiene a gore disposición servicios gratuitos de asistencia lingüística. Llame al [phone].    We comply with applicable federal civil rights laws and [place] laws. We do not discriminate on the basis of race, color, national origin, age, disability, sex, sexual orientation, or gender identity.            Thank you!     Thank you for choosing [facility]  for your care. Our goal is always to provide you with excellent care. Hearing back from our patients is one way we can continue to improve our services. Please take a few minutes to complete the written survey that you may receive in the mail after your visit with us. Thank you!             Your Updated Medication List - Protect others around you: Learn how to safely use, store and throw away your medicines at www.disposemymeds.org.      Notice  As of 6/14/2018  6:03 PM    You have not been prescribed any medications.
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
## 2018-06-28 ENCOUNTER — OFFICE VISIT (OUTPATIENT)
Dept: PSYCHIATRY | Facility: CLINIC | Age: 33
End: 2018-06-28
Attending: PSYCHOLOGIST
Payer: COMMERCIAL

## 2018-06-28 DIAGNOSIS — F43.23 ADJUSTMENT DISORDER WITH MIXED ANXIETY AND DEPRESSED MOOD: Primary | ICD-10-CM
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
## 2018-06-28 NOTE — MR AVS SNAPSHOT
After Visit Summary   2018    Sharad Hernandez    MRN: [id number]           Patient Information     Date Of Birth          1985        Visit Information        Provider Department      2018 4:00 PM Janett Prater, PhD [facility]        Today's Diagnoses     Adjustment disorder with mixed anxiety and depressed mood    -  1       Follow-ups after your visit        Who to contact     Please call your clinic at [phone] to:    Ask questions about your health    Make or cancel appointments    Discuss your medicines    Learn about your test results    Speak to your doctor            Additional Information About Your Visit        MyChart Information     SeniorLiving.Net is an electronic gateway that provides easy, online access to your medical records. With SeniorLiving.Net, you can request a clinic appointment, read your test results, renew a prescription or communicate with your care team.     To sign up for Echovoxt visit the website at [email or web address]   You will be asked to enter the access code listed below, as well as some personal information. Please follow the directions to create your username and password.     Your access code is: [email or web address]  Expires: 2018  4:51 PM     Your access code will  in 90 days. If you need help or a new code, please contact your [facility] or call [phone] for assistance.        Care EveryWhere ID     This is your Care EveryWhere ID. This could be used by other organizations to access your [place] medical records  [id number]         Blood Pressure from Last 3 Encounters:   No data found for BP    Weight from Last 3 Encounters:   No data found for Wt              Today, you had the following     No orders found for display       Primary Care Provider Office Phone # Fax #    Ej Solares [phone] [address] [facility] [address]        Equal Access to Services      ANNABELLA ZEPEDA : Hadii aad shelby farhad Zhao, waedida luqadaha, qaybta kaalmada lorikailyncierra, heri snelltawannasherry leon. So [facility] [phone].    ATENCIÓN: Si habla español, tiene a gore disposición servicios gratuitos de asistencia lingüística. Llame al [phone].    We comply with applicable federal civil rights laws and [place] laws. We do not discriminate on the basis of race, color, national origin, age, disability, sex, sexual orientation, or gender identity.            Thank you!     Thank you for choosing [facility]  for your care. Our goal is always to provide you with excellent care. Hearing back from our patients is one way we can continue to improve our services. Please take a few minutes to complete the written survey that you may receive in the mail after your visit with us. Thank you!             Your Updated Medication List - Protect others around you: Learn how to safely use, store and throw away your medicines at www.disposemymeds.org.      Notice  As of 6/28/2018  4:57 PM    You have not been prescribed any medications.

## 2018-07-18 ENCOUNTER — OFFICE VISIT (OUTPATIENT)
Dept: PSYCHIATRY | Facility: CLINIC | Age: 33
End: 2018-07-18
Attending: PSYCHOLOGIST
Payer: COMMERCIAL

## 2018-07-18 DIAGNOSIS — F43.23 ADJUSTMENT DISORDER WITH MIXED ANXIETY AND DEPRESSED MOOD: Primary | ICD-10-CM

## 2018-07-18 NOTE — MR AVS SNAPSHOT
After Visit Summary   2018    Sharad Hernandez    MRN: [id number]           Patient Information     Date Of Birth          1985        Visit Information        Provider Department      2018 4:00 PM Janett Prater, PhD [facility]        Today's Diagnoses     Adjustment disorder with mixed anxiety and depressed mood    -  1       Follow-ups after your visit        Your next 10 appointments already scheduled     Aug 01, 2018  4:00 PM CDT   Adult Psychotherapy with Janett Carey, PhD   Psychiatry Clinic ([facility])    [place] [id number]  [address] [id number] [phone]              Who to contact     Please call your clinic at [phone] to:    Ask questions about your health    Make or cancel appointments    Discuss your medicines    Learn about your test results    Speak to your doctor            Additional Information About Your Visit        MyChart Information     Faveoust is an electronic gateway that provides easy, online access to your medical records. With mmCHANNEL, you can request a clinic appointment, read your test results, renew a prescription or communicate with your care team.     To sign up for Faveoust visit the website at [email or web address]   You will be asked to enter the access code listed below, as well as some personal information. Please follow the directions to create your username and password.     Your access code is: [email or web address]  Expires: 2018  4:51 PM     Your access code will  in 90 days. If you need help or a new code, please contact your [facility] or call [phone] for assistance.        Care EveryWhere ID     This is your Care EveryWhere ID. This could be used by other organizations to access your [place] medical records  [id number]         Blood Pressure from Last 3 Encounters:   No data found for BP    Weight from Last  3 Encounters:   No data found for Wt              Today, you had the following     No orders found for display       Primary Care Provider Office Phone # Fax #    Ej Solares [phone]       [address] [place] [address]        Equal Access to Services     ANNABELLA ZEPEDA : Hadii aad ku hadushahazel Sonoelali, waaxda luqadaha, qaybta kaalmada adelawsonyada, waxay idiin hayomaraislinn snelltawannasherry leon. So [facility] [phone].    ATENCIÓN: Si habla español, tiene a gore disposición servicios gratuitos de asistencia lingüística. Llame al [phone].    We comply with applicable federal civil rights laws and [place] laws. We do not discriminate on the basis of race, color, national origin, age, disability, sex, sexual orientation, or gender identity.            Thank you!     Thank you for choosing [facility]IN[facility]  for your care. Our goal is always to provide you with excellent care. Hearing back from our patients is one way we can continue to improve our services. Please take a few minutes to complete the written survey that you may receive in the mail after your visit with us. Thank you!             Your Updated Medication List - Protect others around you: Learn how to safely use, store and throw away your medicines at www.disposemymeds.org.      Notice  As of 7/18/2018  5:06 PM    You have not been prescribed any medications.

## 2018-07-18 NOTE — PROGRESS NOTES
Therapy Notes  Provider: Janett Carey, PhD, L.P.  Start time: 4:08 pm Stop Time: 5:05 pm       Objective: D arrived on time.      Subjective: D reported that they are considering mediation and that this has caused significant anxiety and he has not been able to talk to his  yet. D reported that he went to a wedding with his family and could not help but compare himself with others in his generation were doing well and it was hard for him to talk about his life and about the troubles that he has been experiencing. I helped D to encompass the human experience and the different ways in which we end up living our lives with very little control over what it ends up happening.      Assessment: CURTIS was able to reflect about this feelings during his attendance to this wedding. He was also able to cognitively re-appraise his circumstances in light of the many trajectories and imperfect presentations that his peers likely have that he is not aware of.      Plan: Continue exposure as needed, continue bringing up the difficulties of life that we can not always control.      Diagnosis.  Adjustment disorder with mixed anxiety and depressed mood

## 2018-08-01 ENCOUNTER — OFFICE VISIT (OUTPATIENT)
Dept: PSYCHIATRY | Facility: CLINIC | Age: 33
End: 2018-08-01
Attending: PSYCHOLOGIST
Payer: COMMERCIAL

## 2018-08-01 DIAGNOSIS — F43.23 ADJUSTMENT DISORDER WITH MIXED ANXIETY AND DEPRESSED MOOD: Primary | ICD-10-CM

## 2018-08-01 NOTE — PROGRESS NOTES
"Therapy Notes  Provider: Janett Carey, PhD, L.P.  Start time: 4:08 pm Stop Time: 5:03pm       Objective: D arrived on time.      Subjective: D reported that their  are setting up a mediation meeting with her ex that will take place in September. We reviewed his feelings of being in limbo until then. We explored his feelings uncertainty and worked from a \"worst case scenario\" back to his feelings of been on hold and not been able to find a future person or be ready to be with a future person if the divorce were to go in the worst way possible.    Assessment: D was able to understands how his perceptions of what makes a good future partner for somebody else is playing into his anxiety and fear.      Plan: Continue exposure as needed to the worst case scenario of the outcome of the divorce.      Diagnosis.  Adjustment disorder with mixed anxiety and depressed mood  "

## 2018-08-01 NOTE — MR AVS SNAPSHOT
After Visit Summary   8/1/2018    Sharad Hernandez    MRN: [id number]           Patient Information     Date Of Birth          1985        Visit Information        Provider Department      8/1/2018 4:00 PM Janett Prater, PhD [facility]        Today's Diagnoses     Adjustment disorder with mixed anxiety and depressed mood    -  1       Follow-ups after your visit        Your next 10 appointments already scheduled     Aug 09, 2018  4:00 PM CDT   Adult Psychotherapy with Janett Carey, PhD   Psychiatry Clinic ([facility])    [place] [id number]  [address] [id number] [phone]              Who to contact     Please call your clinic at [phone] to:    Ask questions about your health    Make or cancel appointments    Discuss your medicines    Learn about your test results    Speak to your doctor            Additional Information About Your Visit        MyChart Information     [employer] gives you secure access to your electronic health record. If you see a primary care provider, you can also send messages to your care team and make appointments. If you have questions, please call your primary care clinic.  If you do not have a primary care provider, please call [phone] and they will assist you.      Shmoop is an electronic gateway that provides easy, online access to your medical records. With Shmoop, you can request a clinic appointment, read your test results, renew a prescription or communicate with your care team.     To access your existing account, please contact your [facility] or call [phone] for assistance.        Care EveryWhere ID     This is your Care EveryWhere ID. This could be used by other organizations to access your [place] medical records  [id number]         Blood Pressure from Last 3 Encounters:   No data found for BP    Weight from Last 3 Encounters:    No data found for Wt              Today, you had the following     No orders found for display       Primary Care Provider Office Phone # Fax #    Ej Solares [phone]       [address] [place] [address]        Equal Access to Services     ANNABELLA ZEPEDA : Hadii aad ku hadushao Sonoelali, waaxda luqadaha, qaybta kaalmada adezoeda, heri kamalain hayaaaislinn sandoval philsherry leon. So [facility] [phone].    ATENCIÓN: Si habla español, tiene a gore disposición servicios gratuitos de asistencia lingüística. Llame al [phone].    We comply with applicable federal civil rights laws and [place] laws. We do not discriminate on the basis of race, color, national origin, age, disability, sex, sexual orientation, or gender identity.            Thank you!     Thank you for choosing [facility]RY CLINIC  for your care. Our goal is always to provide you with excellent care. Hearing back from our patients is one way we can continue to improve our services. Please take a few minutes to complete the written survey that you may receive in the mail after your visit with us. Thank you!             Your Updated Medication List - Protect others around you: Learn how to safely use, store and throw away your medicines at www.disposemymeds.org.      Notice  As of 8/1/2018  5:04 PM    You have not been prescribed any medications.

## 2018-09-13 ENCOUNTER — OFFICE VISIT (OUTPATIENT)
Dept: PSYCHIATRY | Facility: CLINIC | Age: 33
End: 2018-09-13
Attending: PSYCHOLOGIST
Payer: COMMERCIAL

## 2018-09-13 DIAGNOSIS — F43.23 ADJUSTMENT DISORDER WITH MIXED ANXIETY AND DEPRESSED MOOD: Primary | ICD-10-CM

## 2018-09-13 NOTE — PROGRESS NOTES
Therapy Notes  Provider: Janett Carey, PhD, L.P.  Start time: 4:07 pm Stop Time: 5:00pm       Objective: CURTIS arrived 7 mns late.      Subjective: D reported that his wife  is quitting which may results in delays for him. CURTIS is waiting for his  to get back to him. D stated that he is working out, his mood is stable and he is starting to move with his life without dwelling on his divorce. D and I agreed to see each other every two weeks given his clear improvement.     Assessment: CURTIS appears to be coping significantly well with his divorce process. He is healthy and his mood has improved significantly.      Plan: Continue therapeutic support as needed.      Diagnosis.  Adjustment disorder with mixed anxiety and depressed mood

## 2018-09-13 NOTE — MR AVS SNAPSHOT
After Visit Summary   9/13/2018    Sharad Hernandez    MRN: [id number]           Patient Information     Date Of Birth          1985        Visit Information        Provider Department      9/13/2018 5:00 PM Janett Prater, PhD [facility]        Today's Diagnoses     Adjustment disorder with mixed anxiety and depressed mood    -  1       Follow-ups after your visit        Who to contact     Please call your clinic at [phone] to:    Ask questions about your health    Make or cancel appointments    Discuss your medicines    Learn about your test results    Speak to your doctor            Additional Information About Your Visit        MyChart Information     Force-A gives you secure access to your electronic health record. If you see a primary care provider, you can also send messages to your care team and make appointments. If you have questions, please call your primary care clinic.  If you do not have a primary care provider, please call [phone] and they will assist you.      Force-A is an electronic gateway that provides easy, online access to your medical records. With Force-A, you can request a clinic appointment, read your test results, renew a prescription or communicate with your care team.     To access your existing account, please contact your [facility] or call [phone] for assistance.        Care EveryWhere ID     This is your Care EveryWhere ID. This could be used by other organizations to access your [place] medical records  [id number]         Blood Pressure from Last 3 Encounters:   No data found for BP    Weight from Last 3 Encounters:   No data found for Wt              Today, you had the following     No orders found for display       Primary Care Provider Office Phone # Fax #    Ej Solares [phone]       [address] [place] [address]        Equal Access to Services     ANNABELLA ZEPEDA AH:  Hadii ifeanyi Zhao, waedida luqadaha, qakunalta kacheyenne davidshahida, heri kenisha aditiaislinn hernandezlawson alistawannahserry arzateweiaislinn edward. So [facility] [phone].    ATENCIÓN: Si habla español, tiene a gore disposición servicios gratuitos de asistencia lingüística. Llame al [phone].    We comply with applicable federal civil rights laws and [place] laws. We do not discriminate on the basis of race, color, national origin, age, disability, sex, sexual orientation, or gender identity.            Thank you!     Thank you for choosing [facility]RY [facility]IN[facility]  for your care. Our goal is always to provide you with excellent care. Hearing back from our patients is one way we can continue to improve our services. Please take a few minutes to complete the written survey that you may receive in the mail after your visit with us. Thank you!             Your Updated Medication List - Protect others around you: Learn how to safely use, store and throw away your medicines at www.disposemymeds.org.      Notice  As of 9/13/2018  5:51 PM    You have not been prescribed any medications.

## 2018-09-26 ENCOUNTER — OFFICE VISIT (OUTPATIENT)
Dept: PSYCHIATRY | Facility: CLINIC | Age: 33
End: 2018-09-26
Attending: PSYCHOLOGIST
Payer: COMMERCIAL

## 2018-09-26 DIAGNOSIS — F43.23 ADJUSTMENT DISORDER WITH MIXED ANXIETY AND DEPRESSED MOOD: Primary | ICD-10-CM
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
## 2018-09-26 NOTE — MR AVS SNAPSHOT
After Visit Summary   9/26/2018    Sahrad Hernandez    MRN: [id number]           Patient Information     Date Of Birth          1985        Visit Information        Provider Department      9/26/2018 2:00 PM Janett Prater, PhD [facility]        Today's Diagnoses     Adjustment disorder with mixed anxiety and depressed mood    -  1       Follow-ups after your visit        Who to contact     Please call your clinic at [phone] to:    Ask questions about your health    Make or cancel appointments    Discuss your medicines    Learn about your test results    Speak to your doctor            Additional Information About Your Visit        MyChart Information     Conjunct gives you secure access to your electronic health record. If you see a primary care provider, you can also send messages to your care team and make appointments. If you have questions, please call your primary care clinic.  If you do not have a primary care provider, please call [phone] and they will assist you.      Conjunct is an electronic gateway that provides easy, online access to your medical records. With Conjunct, you can request a clinic appointment, read your test results, renew a prescription or communicate with your care team.     To access your existing account, please contact your [facility] or call [phone] for assistance.        Care EveryWhere ID     This is your Care EveryWhere ID. This could be used by other organizations to access your [place] medical records  [id number]         Blood Pressure from Last 3 Encounters:   No data found for BP    Weight from Last 3 Encounters:   No data found for Wt              Today, you had the following     No orders found for display       Primary Care Provider Office Phone # Fax #    Ej Solares [phone]       [address] [place] [address]        Equal Access to Services     ANNABELLA ZEPEDA AH:  Hadii ifeanyi Zhao, waedida luqadaha, qakunalta kacheyenne davidshahida, heri kenisha aditiaislinn hernandezlawson alisjovanny laweiaislinn edward. So [facility] [phone].    ATENCIÓN: Si habla español, tiene a gore disposición servicios gratuitos de asistencia lingüística. Llame al [phone].    We comply with applicable federal civil rights laws and [place] laws. We do not discriminate on the basis of race, color, national origin, age, disability, sex, sexual orientation, or gender identity.            Thank you!     Thank you for choosing [facility]ATRY CLINIC  for your care. Our goal is always to provide you with excellent care. Hearing back from our patients is one way we can continue to improve our services. Please take a few minutes to complete the written survey that you may receive in the mail after your visit with us. Thank you!             Your Updated Medication List - Protect others around you: Learn how to safely use, store and throw away your medicines at www.disposemymeds.org.      Notice  As of 9/26/2018  3:03 PM    You have not been prescribed any medications.

## 2018-10-03 ENCOUNTER — OFFICE VISIT (OUTPATIENT)
Dept: PSYCHIATRY | Facility: CLINIC | Age: 33
End: 2018-10-03
Attending: PSYCHOLOGIST
Payer: COMMERCIAL

## 2018-10-03 DIAGNOSIS — F43.23 ADJUSTMENT DISORDER WITH MIXED ANXIETY AND DEPRESSED MOOD: Primary | ICD-10-CM
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
## 2018-10-18 ENCOUNTER — OFFICE VISIT (OUTPATIENT)
Dept: PSYCHIATRY | Facility: CLINIC | Age: 33
End: 2018-10-18
Attending: PSYCHOLOGIST
Payer: COMMERCIAL

## 2018-10-18 DIAGNOSIS — F43.23 ADJUSTMENT DISORDER WITH MIXED ANXIETY AND DEPRESSED MOOD: Primary | ICD-10-CM

## 2018-10-18 NOTE — MR AVS SNAPSHOT
After Visit Summary   10/18/2018    Sharad Hernandez    MRN: [id number]           Patient Information     Date Of Birth          1985        Visit Information        Provider Department      10/18/2018 5:00 PM Janett Prater, PhD [facility]        Today's Diagnoses     Adjustment disorder with mixed anxiety and depressed mood    -  1       Follow-ups after your visit        Who to contact     Please call your clinic at [phone] to:    Ask questions about your health    Make or cancel appointments    Discuss your medicines    Learn about your test results    Speak to your doctor            Additional Information About Your Visit        MyChart Information     Altobridge gives you secure access to your electronic health record. If you see a primary care provider, you can also send messages to your care team and make appointments. If you have questions, please call your primary care clinic.  If you do not have a primary care provider, please call [phone] and they will assist you.      Altobridge is an electronic gateway that provides easy, online access to your medical records. With Altobridge, you can request a clinic appointment, read your test results, renew a prescription or communicate with your care team.     To access your existing account, please contact your [facility] or call [phone] for assistance.        Care EveryWhere ID     This is your Care EveryWhere ID. This could be used by other organizations to access your [place] medical records  [id number]         Blood Pressure from Last 3 Encounters:   No data found for BP    Weight from Last 3 Encounters:   No data found for Wt              Today, you had the following     No orders found for display       Primary Care Provider Office Phone # Fax #    Ej Solares [phone]       [address] [place] [address]        Equal Access to Services     ANNABELLA ZEPEDA AH:  Hadii ifeanyi Zhao, waedida luqadaha, qakunalta kacheyenne davidshahida, heri kenisha aditiaislinn hernandezlawson alisjovanny laweiaislinn edward. So [facility] [phone].    ATENCIÓN: Si habla español, tiene a gore disposición servicios gratuitos de asistencia lingüística. Llame al [phone].    We comply with applicable federal civil rights laws and [place] laws. We do not discriminate on the basis of race, color, national origin, age, disability, sex, sexual orientation, or gender identity.            Thank you!     Thank you for choosing [facility]ATRY CLINIC  for your care. Our goal is always to provide you with excellent care. Hearing back from our patients is one way we can continue to improve our services. Please take a few minutes to complete the written survey that you may receive in the mail after your visit with us. Thank you!             Your Updated Medication List - Protect others around you: Learn how to safely use, store and throw away your medicines at www.disposemymeds.org.      Notice  As of 10/18/2018  5:40 PM    You have not been prescribed any medications.

## 2018-10-18 NOTE — PROGRESS NOTES
Therapy Notes  Provider: Janett Carey, PhD, L.P.  Start time: 4:57 pm Stop Time: 5:50 pm       Objective: CURTIS arrived a bit earlier and we started the session early.      Subjective: CURTIS reported that he is struggling to find an apartment but he discussed 3 possible choices for his move and appeared to have made a choice, additionally we discussed his options as well as the meaning of the move for his well being and the symbolic connection to his impending divorce.     Assessment: CURTIS is coping well and moving on with his life.      Plan: Continue therapeutic support as needed. Continue exposure and anxiety reduction strategies as needed, both with regards to the past trauma and current stress.      Diagnosis.  Adjustment disorder with mixed anxiety and depressed mood

## 2018-11-01 ENCOUNTER — OFFICE VISIT (OUTPATIENT)
Dept: PSYCHIATRY | Facility: CLINIC | Age: 33
End: 2018-11-01
Attending: PSYCHOLOGIST
Payer: COMMERCIAL

## 2018-11-01 DIAGNOSIS — F43.23 ADJUSTMENT DISORDER WITH MIXED ANXIETY AND DEPRESSED MOOD: Primary | ICD-10-CM
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
## 2018-11-01 NOTE — MR AVS SNAPSHOT
After Visit Summary   11/1/2018    Sharad Hernandez    MRN: [id number]           Patient Information     Date Of Birth          1985        Visit Information        Provider Department      11/1/2018 4:00 PM Janett Prater, PhD [facility]        Today's Diagnoses     Adjustment disorder with mixed anxiety and depressed mood    -  1       Follow-ups after your visit        Your next 10 appointments already scheduled     Nov 12, 2018  5:00 PM CST   Adult Psychotherapy with Janett Carey, PhD   Psychiatry Clinic ([facility])    [address]  [address] [phone] [phone]              Who to contact     Please call your clinic at [phone] to:    Ask questions about your health    Make or cancel appointments    Discuss your medicines    Learn about your test results    Speak to your doctor            Additional Information About Your Visit        MyChart Information     [employer] gives you secure access to your electronic health record. If you see a primary care provider, you can also send messages to your care team and make appointments. If you have questions, please call your primary care clinic.  If you do not have a primary care provider, please call [phone] and they will assist you.      Sunbay is an electronic gateway that provides easy, online access to your medical records. With Sunbay, you can request a clinic appointment, read your test results, renew a prescription or communicate with your care team.     To access your existing account, please contact your [facility] or call [phone] for assistance.        Care EveryWhere ID     This is your Care EveryWhere ID. This could be used by other organizations to access your [place] medical records  [id number]         Blood Pressure from Last 3 Encounters:   No data found for BP    Weight from Last 3 Encounters:    No data found for Wt              Today, you had the following     No orders found for display       Primary Care Provider Office Phone # Fax #    Ej Solares [phone]       [address] [place] [address]        Equal Access to Services     ANNABELLA ZEPEDA : Hadii aad ku hadushahazel Sonoelali, waaxda luqadaha, qaybta kaalmada adezoeda, heri kamalain hayaaaislinn sandoval philsherry leon. So [facility] [phone].    ATENCIÓN: Si habla español, tiene a gore disposición servicios gratuitos de asistencia lingüística. Llame al [phone].    We comply with applicable federal civil rights laws and [place] laws. We do not discriminate on the basis of race, color, national origin, age, disability, sex, sexual orientation, or gender identity.            Thank you!     Thank you for choosing [facility]  for your care. Our goal is always to provide you with excellent care. Hearing back from our patients is one way we can continue to improve our services. Please take a few minutes to complete the written survey that you may receive in the mail after your visit with us. Thank you!             Your Updated Medication List - Protect others around you: Learn how to safely use, store and throw away your medicines at www.disposemymeds.org.      Notice  As of 11/1/2018  5:00 PM    You have not been prescribed any medications.

## 2018-11-15 ENCOUNTER — OFFICE VISIT (OUTPATIENT)
Dept: PSYCHIATRY | Facility: CLINIC | Age: 33
End: 2018-11-15
Attending: PSYCHOLOGIST
Payer: COMMERCIAL

## 2018-11-15 DIAGNOSIS — F43.23 ADJUSTMENT DISORDER WITH MIXED ANXIETY AND DEPRESSED MOOD: Primary | ICD-10-CM

## 2018-11-15 NOTE — PROGRESS NOTES
Therapy Notes  Provider: Janett Carey, PhD, L.P.  Start time: 5:00 pm Stop Time: 5:35 pm       Objective: CURTIS arrived a bit late.      Subjective: D reported that he is doing quite well and adapting to his new place. We agreed that he will make his next appointment right after he gets his divorce papers and the divorce is final.      Assessment: CURTIS is coping well and moving on with his life.      Plan: Process end of marriage and discuss termination as needed.      Diagnosis.  Adjustment disorder with mixed anxiety and depressed mood

## 2018-11-15 NOTE — MR AVS SNAPSHOT
After Visit Summary   11/15/2018    Sharad Hernandez    MRN: [id number]           Patient Information     Date Of Birth          1985        Visit Information        Provider Department      11/15/2018 5:00 PM [facility] Prater, PhD [facility]        Today's Diagnoses     Adjustment disorder with mixed anxiety and depressed mood    -  1       Follow-ups after your visit        Who to contact     Please call your clinic at [phone] to:    Ask questions about your health    Make or cancel appointments    Discuss your medicines    Learn about your test results    Speak to your doctor            Additional Information About Your Visit        MyChart Information     [employer] gives you secure access to your electronic health record. If you see a primary care provider, you can also send messages to your care team and make appointments. If you have questions, please call your primary care clinic.  If you do not have a primary care provider, please call [phone] and they will assist you.      [employer] is an electronic gateway that provides easy, online access to your medical records. With [employer], you can request a clinic appointment, read your test results, renew a prescription or communicate with your care team.     To access your existing account, please contact your [facility] or call [phone] for assistance.        Care EveryWhere ID     This is your Care EveryWhere ID. This could be used by other organizations to access your [facility] medical records  [id number]         Blood Pressure from Last 3 Encounters:   No data found for BP    Weight from Last 3 Encounters:   No data found for Wt              Today, you had the following     No orders found for display       Primary Care Provider Office Phone # Fax #    Ej Solares [phone]       [address] [place] [id number]        Equal Access to Services     ANNABELLA ZEPEDA AH:  Hadii ifeanyi Zhao, waedida luqadaha, qakunalta kacheyenne davidshahida, heri kenisha aditiaislinn hernandezlawson alisjovanny laweiaislinn edward. So [facility] [phone].    ATENCIÓN: Si habla español, tiene a gore disposición servicios gratuitos de asistencia lingüística. Llame al [phone].    We comply with applicable federal civil rights laws and [place] laws. We do not discriminate on the basis of race, color, national origin, age, disability, sex, sexual orientation, or gender identity.            Thank you!     Thank you for choosing [facility]  for your care. Our goal is always to provide you with excellent care. Hearing back from our patients is one way we can continue to improve our services. Please take a few minutes to complete the written survey that you may receive in the mail after your visit with us. Thank you!             Your Updated Medication List - Protect others around you: Learn how to safely use, store and throw away your medicines at www.disposemymeds.org.      Notice  As of 11/15/2018  5:35 PM    You have not been prescribed any medications.

## 2020-03-11 ENCOUNTER — HEALTH MAINTENANCE LETTER (OUTPATIENT)
Age: 35
End: 2020-03-11

## 2024-06-26 NOTE — MR AVS SNAPSHOT
After Visit Summary   10/3/2018    Sharad Hernandez    MRN: [id number]           Patient Information     Date Of Birth          1985        Visit Information        Provider Department      10/3/2018 5:00 PM [facility]ett Prater, PhD [facility]        Today's Diagnoses     Adjustment disorder with mixed anxiety and depressed mood    -  1       Follow-ups after your visit        Who to contact     Please call your clinic at [phone] to:    Ask questions about your health    Make or cancel appointments    Discuss your medicines    Learn about your test results    Speak to your doctor            Additional Information About Your Visit        MyChart Information     [employer] gives you secure access to your electronic health record. If you see a primary care provider, you can also send messages to your care team and make appointments. If you have questions, please call your primary care clinic.  If you do not have a primary care provider, please call [phone] and they will assist you.      [employer] is an electronic gateway that provides easy, online access to your medical records. With [employer], you can request a clinic appointment, read your test results, renew a prescription or communicate with your care team.     To access your existing account, please contact your [facility] or call [phone] for assistance.        Care EveryWhere ID     This is your Care EveryWhere ID. This could be used by other organizations to access your [facility] medical records  [id number]         Blood Pressure from Last 3 Encounters:   No data found for BP    Weight from Last 3 Encounters:   No data found for Wt              Today, you had the following     No orders found for display       Primary Care Provider Office Phone # Fax #    Ej Solares [phone]       [address] [place] [address]        Equal Access to Services     ANNABELLA ZEPEDA AH:  Hadii ifeanyi Zhao, waedida luqadaha, qakunalta kacheyenne medellin, heri kenisha aditiaislinn hernandezlawson alisjovanny laweiaislinn edward. So [facility] [phone].    ATENCIÓN: Si habla español, tiene a gore disposición servicios gratuitos de asistencia lingüística. Llame al [phone].    We comply with applicable federal civil rights laws and [place] laws. We do not discriminate on the basis of race, color, national origin, age, disability, sex, sexual orientation, or gender identity.            Thank you!     Thank you for choosing [facility]  for your care. Our goal is always to provide you with excellent care. Hearing back from our patients is one way we can continue to improve our services. Please take a few minutes to complete the written survey that you may receive in the mail after your visit with us. Thank you!             Your Updated Medication List - Protect others around you: Learn how to safely use, store and throw away your medicines at www.disposemymeds.org.      Notice  As of 10/3/2018  6:03 PM    You have not been prescribed any medications.       denies pain/discomfort (Rating = 0)